# Patient Record
Sex: FEMALE | Race: WHITE | NOT HISPANIC OR LATINO
[De-identification: names, ages, dates, MRNs, and addresses within clinical notes are randomized per-mention and may not be internally consistent; named-entity substitution may affect disease eponyms.]

---

## 2023-08-19 ENCOUNTER — RESULT REVIEW (OUTPATIENT)
Age: 43
End: 2023-08-19

## 2023-08-29 ENCOUNTER — RESULT REVIEW (OUTPATIENT)
Age: 43
End: 2023-08-29

## 2023-08-31 ENCOUNTER — APPOINTMENT (OUTPATIENT)
Dept: PULMONOLOGY | Facility: CLINIC | Age: 43
End: 2023-08-31

## 2023-09-05 PROBLEM — Z00.00 ENCOUNTER FOR PREVENTIVE HEALTH EXAMINATION: Status: ACTIVE | Noted: 2023-09-05

## 2023-09-07 ENCOUNTER — APPOINTMENT (OUTPATIENT)
Dept: THORACIC SURGERY | Facility: CLINIC | Age: 43
End: 2023-09-07
Payer: COMMERCIAL

## 2023-09-07 VITALS
RESPIRATION RATE: 18 BRPM | HEART RATE: 69 BPM | SYSTOLIC BLOOD PRESSURE: 130 MMHG | OXYGEN SATURATION: 98 % | DIASTOLIC BLOOD PRESSURE: 81 MMHG

## 2023-09-07 DIAGNOSIS — J90 PLEURAL EFFUSION, NOT ELSEWHERE CLASSIFIED: ICD-10-CM

## 2023-09-07 PROCEDURE — 99204 OFFICE O/P NEW MOD 45 MIN: CPT

## 2023-09-07 NOTE — HISTORY OF PRESENT ILLNESS
[FreeTextEntry1] : 42-year-old female referred by pulmonologist Dr. South after being evaluated for right pleural effusion. She went to urgent care for chest discomfort and was told it was musculoskeletal in nature with no further workup. She was then seen by Dr. Barboza for pulmonary evaluation and underwent testing at [facility] where she was started on a 7 day course of Augmentin. She then presented to [facility] on August 11 where chest  x-ray revealed a moderate right pleural effusion with partial loculation.  There was no evidence of pulmonary embolus.  She was discharged to home with recommendation for pulmonary follow-up.  She has no prior history of pulmonary disease.  She had not been in contact with anyone who has been ill.  She has no history of TB or exposure to TB. She is now s/p thoracentesis 8/29/2023. Pathology was nonspecific but concerning for possible mesothelioma. Patient now presents for thoracic surgery consultation. She endorses improvement in her symptoms and denies any SOB at this time.   Pathology as follows:  FINAL CYTOLOGIC DIAGNOSIS  Right pleural fluid, thoracentesis: NEGATIVE FOR MALIGNANCY - Mesothelial cells, histiocytes and mixed inflammatory cells present  
clear

## 2023-09-07 NOTE — DATA REVIEWED
[FreeTextEntry1] : Specimen(s)    Right pleural fluid    Clinical History    Right pleural effusion   FINAL CYTOLOGIC DIAGNOSIS    Right pleural fluid, thoracentesis: NEGATIVE FOR MALIGNANCY - Mesothelial cells, histiocytes and mixed inflammatory cells present      Diagnosis Comment    The histopathologic section (cell block) shows scattered mesothelial cells, histiocytes and abundant mixed inflammatory cells. The ThinPrep slide shows proteinaceous debris.  Immunohistochemical stains performed on cell block and showed following staining pattern: - Mesothelial markers, WT-1 and Calretinin are positive in mesothelial cells. - Epithelial markers, MOC-31 and Claudin-4 are negative. - Mesothelial markers CD68 highligt histiocytes.

## 2023-09-07 NOTE — PHYSICAL EXAM
[General Appearance - Alert] : alert [General Appearance - In No Acute Distress] : in no acute distress [General Appearance - Well Developed] : well developed [Sclera] : the sclera and conjunctiva were normal [Outer Ear] : the ears and nose were normal in appearance [Neck Appearance] : the appearance of the neck was normal [Neck Cervical Mass (___cm)] : no neck mass was observed [Respiration, Rhythm And Depth] : normal respiratory rhythm and effort [Auscultation Breath Sounds / Voice Sounds] : lungs were clear to auscultation bilaterally [Heart Rate And Rhythm] : heart rate was normal and rhythm regular [Heart Sounds] : normal S1 and S2 [Examination Of The Chest] : the chest was normal in appearance [Abnormal Walk] : normal gait [Skin Color & Pigmentation] : normal skin color and pigmentation [] : no rash [No Focal Deficits] : no focal deficits [Oriented To Time, Place, And Person] : oriented to person, place, and time [Impaired Insight] : insight and judgment were intact [Mood] : the mood was normal

## 2023-09-25 ENCOUNTER — RESULT REVIEW (OUTPATIENT)
Age: 43
End: 2023-09-25

## 2023-09-27 ENCOUNTER — APPOINTMENT (OUTPATIENT)
Dept: THORACIC SURGERY | Facility: CLINIC | Age: 43
End: 2023-09-27
Payer: COMMERCIAL

## 2023-09-27 PROCEDURE — 99442: CPT

## 2023-11-16 ENCOUNTER — RESULT REVIEW (OUTPATIENT)
Age: 43
End: 2023-11-16

## 2023-11-30 ENCOUNTER — APPOINTMENT (OUTPATIENT)
Dept: THORACIC SURGERY | Facility: CLINIC | Age: 43
End: 2023-11-30
Payer: COMMERCIAL

## 2023-11-30 VITALS
WEIGHT: 135 LBS | OXYGEN SATURATION: 99 % | DIASTOLIC BLOOD PRESSURE: 79 MMHG | BODY MASS INDEX: 23.92 KG/M2 | HEIGHT: 63 IN | RESPIRATION RATE: 16 BRPM | SYSTOLIC BLOOD PRESSURE: 112 MMHG

## 2023-11-30 PROCEDURE — 99213 OFFICE O/P EST LOW 20 MIN: CPT

## 2025-04-22 ENCOUNTER — APPOINTMENT (OUTPATIENT)
Dept: THORACIC SURGERY | Facility: CLINIC | Age: 45
End: 2025-04-22
Payer: COMMERCIAL

## 2025-04-22 VITALS
SYSTOLIC BLOOD PRESSURE: 146 MMHG | BODY MASS INDEX: 24.45 KG/M2 | DIASTOLIC BLOOD PRESSURE: 89 MMHG | RESPIRATION RATE: 14 BRPM | WEIGHT: 138 LBS | HEART RATE: 66 BPM | OXYGEN SATURATION: 99 % | HEIGHT: 63 IN

## 2025-04-22 DIAGNOSIS — Z78.9 OTHER SPECIFIED HEALTH STATUS: ICD-10-CM

## 2025-04-22 PROCEDURE — 99214 OFFICE O/P EST MOD 30 MIN: CPT
